# Patient Record
Sex: MALE | Race: WHITE | Employment: FULL TIME | URBAN - METROPOLITAN AREA
[De-identification: names, ages, dates, MRNs, and addresses within clinical notes are randomized per-mention and may not be internally consistent; named-entity substitution may affect disease eponyms.]

---

## 2021-02-04 ENCOUNTER — OFFICE VISIT (OUTPATIENT)
Dept: FAMILY MEDICINE | Facility: CLINIC | Age: 31
End: 2021-02-04
Payer: COMMERCIAL

## 2021-02-04 VITALS
BODY MASS INDEX: 22.08 KG/M2 | HEIGHT: 72 IN | HEART RATE: 74 BPM | TEMPERATURE: 97.6 F | SYSTOLIC BLOOD PRESSURE: 120 MMHG | DIASTOLIC BLOOD PRESSURE: 72 MMHG | WEIGHT: 163 LBS

## 2021-02-04 DIAGNOSIS — Z86.73 HISTORY OF STROKE: ICD-10-CM

## 2021-02-04 DIAGNOSIS — R55 VASOVAGAL SYNCOPE: Primary | ICD-10-CM

## 2021-02-04 DIAGNOSIS — R79.89 ELEVATED SERUM CREATININE: ICD-10-CM

## 2021-02-04 LAB
ANION GAP SERPL CALCULATED.3IONS-SCNC: 1 MMOL/L (ref 3–14)
BUN SERPL-MCNC: 18 MG/DL (ref 7–30)
CALCIUM SERPL-MCNC: 9.5 MG/DL (ref 8.5–10.1)
CHLORIDE SERPL-SCNC: 106 MMOL/L (ref 94–109)
CO2 SERPL-SCNC: 33 MMOL/L (ref 20–32)
CREAT SERPL-MCNC: 1.44 MG/DL (ref 0.66–1.25)
GFR SERPL CREATININE-BSD FRML MDRD: 64 ML/MIN/{1.73_M2}
GLUCOSE SERPL-MCNC: 94 MG/DL (ref 70–99)
POTASSIUM SERPL-SCNC: 4.2 MMOL/L (ref 3.4–5.3)
SODIUM SERPL-SCNC: 140 MMOL/L (ref 133–144)

## 2021-02-04 PROCEDURE — 36415 COLL VENOUS BLD VENIPUNCTURE: CPT | Performed by: PHYSICIAN ASSISTANT

## 2021-02-04 PROCEDURE — 99203 OFFICE O/P NEW LOW 30 MIN: CPT | Performed by: PHYSICIAN ASSISTANT

## 2021-02-04 PROCEDURE — 80048 BASIC METABOLIC PNL TOTAL CA: CPT | Performed by: PHYSICIAN ASSISTANT

## 2021-02-04 RX ORDER — ASPIRIN 81 MG/1
81 TABLET ORAL DAILY
COMMUNITY

## 2021-02-04 ASSESSMENT — MIFFLIN-ST. JEOR: SCORE: 1732.36

## 2021-02-04 ASSESSMENT — PAIN SCALES - GENERAL: PAINLEVEL: NO PAIN (0)

## 2021-02-04 NOTE — PROGRESS NOTES
"  Assessment & Plan     (R55) Vasovagal syncope  (primary encounter diagnosis)  Comment: happened before. No red flags in history. Eval at ED unremarkable   Plan: no further work up for syncope at this time given initial work up in ED negative, however discussed that if he has this happen again or feels he is having any near syncope episodes that would pursue cardiac work up - ([facility], echo)     (R79.89) Elevated serum creatinine  Comment: no baseline for prior comparison. Will recheck today. If still elevated, may consider additional base work up including UA and renal US  Plan: Basic metabolic panel  (Ca, Cl, CO2, Creat,         Gluc, K, Na, BUN)            (Z86.73) History of stroke  Comment: at age 17. [place] and had several tests, labs, imaging with no etiology of stroke. No need for futher follow up  Plan: currently on a baby aspirin        Return in about 1 week (around 2/11/2021) for follow up pending labs and need for more of a work up.    Vivian De La Cruz PA-C  [facility] SENG Lamb is a 30 year old who presents to clinic today for the following health issues     HPI       ED/UC Followup:    Facility:  [facility]  Date of visit: 1/30/21  Reason for visit: Vasovagal Syncope   Current Status: He is having some mild dizziness with changing positions. Otherwise he is feeling okay. His creatinine was elevated at the ER and they told him he should have it checked again.       Physical therapist - inpatient medicine  Was in a patient's room and they came in to do labs  He admits to not doing well with blood and needles, especially when watching  He was interested in this blood draw because they were using ultrasound to find the vein - thinks what \"got him\" was watching them move the needle underneath the skin  Started to feel faint so went to sit over on the window ledge  Continued to feel worse so was going to try to exit " the room but as he was making his exit he blacked out and fell  Per witnesses was not out long and came to right away  No loss of bowel or bladder function  He denies any confusion after the episode  Did hit his head hard and had a big bump on the back which is still palpable but improved    Has since returned to work - worked the following day and felt okay  Has noticed some dizziness with positional change but othewise denies any lingering effects    He was told to follow up because on labwork in the ED he was noted to have an elevated Cr  No prior labs to compare to  [place]    PMHx significant for a stroke at the age of 17  No etiology of stroke after intense work up  Has remained on a baby aspirin since      Review of Systems   Remainder of ROS obtained and found to be negative other than that which was documented above        Objective    /72   Pulse 74   Temp 97.6  F (36.4  C) (Tympanic)   Ht 1.829 m (6')   Wt 73.9 kg (163 lb)   BMI 22.11 kg/m    Body mass index is 22.11 kg/m .  Physical Exam   GENERAL: healthy, alert and no distress  EYES: Eyes grossly normal to inspection, PERRL and EOMI  HENT: ear canals and TM's normal, nose and mouth without ulcers or lesions  NECK: no adenopathy  RESP: lungs clear to auscultation - no rales, rhonchi or wheezes  CV: regular rates and rhythm, normal S1 S2, no S3 or S4 and no murmur, click or rub  MS: no gross musculoskeletal defects noted, no edema  NEURO: Normal strength and tone, sensory exam grossly normal, mentation intact, speech normal, cranial nerves 2-12 intact and Romberg normal  PSYCH: mentation appears normal, affect normal/bright

## 2021-02-07 PROBLEM — Z86.73 HISTORY OF STROKE: Status: ACTIVE | Noted: 2021-02-07

## 2021-02-07 PROBLEM — R79.89 ELEVATED SERUM CREATININE: Status: ACTIVE | Noted: 2021-02-07

## 2021-02-10 DIAGNOSIS — R79.89 ELEVATED SERUM CREATININE: Primary | ICD-10-CM

## 2021-02-22 ENCOUNTER — HOSPITAL ENCOUNTER (OUTPATIENT)
Dept: ULTRASOUND IMAGING | Facility: CLINIC | Age: 31
Discharge: HOME | End: 2021-02-22
Attending: PHYSICIAN ASSISTANT | Admitting: PHYSICIAN ASSISTANT
Payer: COMMERCIAL

## 2021-02-22 DIAGNOSIS — R79.89 ELEVATED SERUM CREATININE: ICD-10-CM

## 2021-02-22 PROCEDURE — 76770 US EXAM ABDO BACK WALL COMP: CPT

## 2021-05-01 ENCOUNTER — HEALTH MAINTENANCE LETTER (OUTPATIENT)
Age: 31
End: 2021-05-01

## 2021-10-11 ENCOUNTER — HEALTH MAINTENANCE LETTER (OUTPATIENT)
Age: 31
End: 2021-10-11

## 2022-05-22 ENCOUNTER — HEALTH MAINTENANCE LETTER (OUTPATIENT)
Age: 32
End: 2022-05-22

## 2022-09-25 ENCOUNTER — HEALTH MAINTENANCE LETTER (OUTPATIENT)
Age: 32
End: 2022-09-25

## 2023-06-04 ENCOUNTER — HEALTH MAINTENANCE LETTER (OUTPATIENT)
Age: 33
End: 2023-06-04